# Patient Record
Sex: FEMALE | Race: OTHER | HISPANIC OR LATINO | URBAN - METROPOLITAN AREA
[De-identification: names, ages, dates, MRNs, and addresses within clinical notes are randomized per-mention and may not be internally consistent; named-entity substitution may affect disease eponyms.]

---

## 2024-07-12 VITALS
HEIGHT: 63 IN | OXYGEN SATURATION: 97 % | WEIGHT: 214.95 LBS | DIASTOLIC BLOOD PRESSURE: 84 MMHG | SYSTOLIC BLOOD PRESSURE: 147 MMHG | RESPIRATION RATE: 16 BRPM | TEMPERATURE: 98 F | HEART RATE: 70 BPM

## 2024-07-12 LAB
ADD ON TEST-SPECIMEN IN LAB: SIGNIFICANT CHANGE UP
ANION GAP SERPL CALC-SCNC: 14 MMOL/L — SIGNIFICANT CHANGE UP (ref 5–17)
ANISOCYTOSIS BLD QL: SLIGHT — SIGNIFICANT CHANGE UP
BASOPHILS # BLD AUTO: 0 K/UL — SIGNIFICANT CHANGE UP (ref 0–0.2)
BASOPHILS NFR BLD AUTO: 0 % — SIGNIFICANT CHANGE UP (ref 0–2)
BUN SERPL-MCNC: 11 MG/DL — SIGNIFICANT CHANGE UP (ref 7–23)
CALCIUM SERPL-MCNC: 9.3 MG/DL — SIGNIFICANT CHANGE UP (ref 8.4–10.5)
CHLORIDE SERPL-SCNC: 93 MMOL/L — LOW (ref 96–108)
CO2 SERPL-SCNC: 28 MMOL/L — SIGNIFICANT CHANGE UP (ref 22–31)
CREAT SERPL-MCNC: 0.53 MG/DL — SIGNIFICANT CHANGE UP (ref 0.5–1.3)
EGFR: 98 ML/MIN/1.73M2 — SIGNIFICANT CHANGE UP
EOSINOPHIL # BLD AUTO: 0 K/UL — SIGNIFICANT CHANGE UP (ref 0–0.5)
EOSINOPHIL NFR BLD AUTO: 0 % — SIGNIFICANT CHANGE UP (ref 0–6)
GIANT PLATELETS BLD QL SMEAR: PRESENT — SIGNIFICANT CHANGE UP
GLUCOSE SERPL-MCNC: 136 MG/DL — HIGH (ref 70–99)
HCT VFR BLD CALC: 48.3 % — HIGH (ref 34.5–45)
HGB BLD-MCNC: 15.7 G/DL — HIGH (ref 11.5–15.5)
HOWELL-JOLLY BOD BLD QL SMEAR: PRESENT — SIGNIFICANT CHANGE UP
HYPOCHROMIA BLD QL: SLIGHT — SIGNIFICANT CHANGE UP
LIDOCAIN IGE QN: 73 U/L — HIGH (ref 7–60)
LYMPHOCYTES # BLD AUTO: 0.43 K/UL — LOW (ref 1–3.3)
LYMPHOCYTES # BLD AUTO: 3.5 % — LOW (ref 13–44)
MACROCYTES BLD QL: SLIGHT — SIGNIFICANT CHANGE UP
MANUAL SMEAR VERIFICATION: SIGNIFICANT CHANGE UP
MCHC RBC-ENTMCNC: 31.3 PG — SIGNIFICANT CHANGE UP (ref 27–34)
MCHC RBC-ENTMCNC: 32.5 GM/DL — SIGNIFICANT CHANGE UP (ref 32–36)
MCV RBC AUTO: 96.2 FL — SIGNIFICANT CHANGE UP (ref 80–100)
MICROCYTES BLD QL: SLIGHT — SIGNIFICANT CHANGE UP
MONOCYTES # BLD AUTO: 0.22 K/UL — SIGNIFICANT CHANGE UP (ref 0–0.9)
MONOCYTES NFR BLD AUTO: 1.8 % — LOW (ref 2–14)
NEUTROPHILS # BLD AUTO: 11.75 K/UL — HIGH (ref 1.8–7.4)
NEUTROPHILS NFR BLD AUTO: 94.7 % — HIGH (ref 43–77)
PLAT MORPH BLD: ABNORMAL
PLATELET # BLD AUTO: 90 K/UL — LOW (ref 150–400)
POIKILOCYTOSIS BLD QL AUTO: SLIGHT — SIGNIFICANT CHANGE UP
POLYCHROMASIA BLD QL SMEAR: SLIGHT — SIGNIFICANT CHANGE UP
POTASSIUM SERPL-MCNC: SIGNIFICANT CHANGE UP (ref 3.5–5.3)
POTASSIUM SERPL-SCNC: SIGNIFICANT CHANGE UP (ref 3.5–5.3)
RBC # BLD: 5.02 M/UL — SIGNIFICANT CHANGE UP (ref 3.8–5.2)
RBC # FLD: 14.8 % — HIGH (ref 10.3–14.5)
RBC BLD AUTO: ABNORMAL
SCHISTOCYTES BLD QL AUTO: SLIGHT — SIGNIFICANT CHANGE UP
SODIUM SERPL-SCNC: 135 MMOL/L — SIGNIFICANT CHANGE UP (ref 135–145)
SPHEROCYTES BLD QL SMEAR: SLIGHT — SIGNIFICANT CHANGE UP
STOMATOCYTES BLD QL SMEAR: SLIGHT — SIGNIFICANT CHANGE UP
TARGETS BLD QL SMEAR: SLIGHT — SIGNIFICANT CHANGE UP
TROPONIN T, HIGH SENSITIVITY RESULT: 280 NG/L — CRITICAL HIGH (ref 0–51)
WBC # BLD: 12.41 K/UL — HIGH (ref 3.8–10.5)
WBC # FLD AUTO: 12.41 K/UL — HIGH (ref 3.8–10.5)

## 2024-07-12 PROCEDURE — 99285 EMERGENCY DEPT VISIT HI MDM: CPT

## 2024-07-12 PROCEDURE — 71045 X-RAY EXAM CHEST 1 VIEW: CPT | Mod: 26

## 2024-07-12 RX ORDER — ASPIRIN 325 MG/1
324 TABLET, FILM COATED ORAL ONCE
Refills: 0 | Status: COMPLETED | OUTPATIENT
Start: 2024-07-12 | End: 2024-07-12

## 2024-07-12 RX ADMIN — ASPIRIN 324 MILLIGRAM(S): 325 TABLET, FILM COATED ORAL at 23:48

## 2024-07-12 NOTE — ED ADULT NURSE NOTE - OBJECTIVE STATEMENT
Pt presented to ED c/o left sided chest tightness started @ 11 am. Pt states she was seen & discharged at [facility] today.  Pt states she was not happy with the care, as per patient, she was told she is fine & is not having a heart attack & was sent home.

## 2024-07-12 NOTE — ED PROVIDER NOTE - OBJECTIVE STATEMENT
74 year old F with ho HTN, HLD presenting with 1 day of midsternal chest pressure with associated PENA and sob. Exertional, sometimes pleuritic. No fever, chills. No cough, no nausea or vomiting, no lightheadedness or dizziness. Was at [facility] ER, discharged with cards fu. States symptoms are worsening. ROS as above.

## 2024-07-12 NOTE — ED ADULT NURSE NOTE - CHIEF COMPLAINT QUOTE
chest pain since this morning; was seen  and d/cd at [place] ER today but pain got worst tonight; h/o HTN and arthritis

## 2024-07-12 NOTE — ED ADULT TRIAGE NOTE - CHIEF COMPLAINT QUOTE
chest pain since this morning; was seen  and d/cd at [place] ER today but pain got worst tonight; h/o HTN and arthritis

## 2024-07-12 NOTE — ED PROVIDER NOTE - CLINICAL SUMMARY MEDICAL DECISION MAKING FREE TEXT BOX
73 yo F presenting with cp, concern for acs, does not perc out, will d-dimer for ro pe, cxr, reassess.

## 2024-07-12 NOTE — ED ADULT TRIAGE NOTE - WEIGHT IN LBS
RN cannot approve Refill Request    RN can NOT refill this medication Protocol failed and NO refill given.      Perlita Mckeon, [facility] Triage/Med Refill 3/16/2020    Requested Prescriptions   Pending Prescriptions Disp Refills     simvastatin (ZOCOR) 20 MG tablet [Pharmacy Med Name: Simvastatin Oral Tablet 20 MG] 45 tablet 0     Sig: TAKE 1/2 TABLET BY MOUTH EVERY DAY       Statins Refill Protocol (Hmg CoA Reductase Inhibitors) Failed - 3/14/2020  7:03 AM        Failed - PCP or prescribing provider visit in past 12 months      Last office visit with prescriber/PCP: 8/21/2018 Faizan Villagomez MD OR same dept: Visit date not found OR same specialty: 8/21/2018 Faizan Villagomez MD  Last physical: 12/19/2017 Last MTM visit: Visit date not found   Next visit within 3 mo: Visit date not found  Next physical within 3 mo: Visit date not found  Prescriber OR PCP: Faizan Villagomez MD  Last diagnosis associated with med order: 1. Hyperlipidemia  - simvastatin (ZOCOR) 20 MG tablet [Pharmacy Med Name: Simvastatin Oral Tablet 20 MG]; TAKE 1/2 TABLET BY MOUTH EVERY DAY  Dispense: 45 tablet; Refill: 0    If protocol passes may refill for 12 months if within 3 months of last provider visit (or a total of 15 months).                   
214.9

## 2024-07-12 NOTE — ED ADULT NURSE NOTE - NSFALLUNIVINTERV_ED_ALL_ED
Bed/Stretcher in lowest position, wheels locked, appropriate side rails in place/Call bell, personal items and telephone in reach/Instruct patient to call for assistance before getting out of bed/chair/stretcher/Non-slip footwear applied when patient is off stretcher/[place] to call system/Physically safe environment - no spills, clutter or unnecessary equipment/Purposeful proactive rounding/Room/bathroom lighting operational, light cord in reach

## 2024-07-12 NOTE — ED PROVIDER NOTE - PHYSICAL EXAMINATION
General: Well appearing, in no acute distress  HEENT: Normocephalic, atraumatic, extraocular movements intact  CV: Regular rate  Pulm: No respiratory distress, no tachypnea, CTAB, no wrr  Abd: Flat, no gross distension  Ext: warm and well perfused, no LE edema  Skin: No gross rashes or lesions  Neuro: Alert and oriented, moving all extremities

## 2024-07-13 ENCOUNTER — INPATIENT (INPATIENT)
Facility: HOSPITAL | Age: 74
LOS: 1 days | Discharge: HOME | End: 2024-07-15
Attending: INTERNAL MEDICINE | Admitting: STUDENT IN AN ORGANIZED HEALTH CARE EDUCATION/TRAINING PROGRAM
Payer: MEDICARE

## 2024-07-13 DIAGNOSIS — E78.5 HYPERLIPIDEMIA, UNSPECIFIED: ICD-10-CM

## 2024-07-13 DIAGNOSIS — Z98.890 OTHER SPECIFIED POSTPROCEDURAL STATES: Chronic | ICD-10-CM

## 2024-07-13 DIAGNOSIS — I21.4 NON-ST ELEVATION (NSTEMI) MYOCARDIAL INFARCTION: ICD-10-CM

## 2024-07-13 DIAGNOSIS — D72.829 ELEVATED WHITE BLOOD CELL COUNT, UNSPECIFIED: ICD-10-CM

## 2024-07-13 DIAGNOSIS — I10 ESSENTIAL (PRIMARY) HYPERTENSION: ICD-10-CM

## 2024-07-13 DIAGNOSIS — Z90.49 ACQUIRED ABSENCE OF OTHER SPECIFIED PARTS OF DIGESTIVE TRACT: Chronic | ICD-10-CM

## 2024-07-13 LAB
A1C WITH ESTIMATED AVERAGE GLUCOSE RESULT: 5.9 % — HIGH (ref 4–5.6)
ADD ON TEST-SPECIMEN IN LAB: SIGNIFICANT CHANGE UP
ANION GAP SERPL CALC-SCNC: 15 MMOL/L — SIGNIFICANT CHANGE UP (ref 5–17)
APTT BLD: 120.4 SEC — CRITICAL HIGH (ref 24.5–35.6)
BUN SERPL-MCNC: 11 MG/DL — SIGNIFICANT CHANGE UP (ref 7–23)
CALCIUM SERPL-MCNC: 9.1 MG/DL — SIGNIFICANT CHANGE UP (ref 8.4–10.5)
CHLORIDE SERPL-SCNC: 94 MMOL/L — LOW (ref 96–108)
CHOLEST SERPL-MCNC: 144 MG/DL — SIGNIFICANT CHANGE UP
CK MB CFR SERPL CALC: 69.7 NG/ML — HIGH (ref 0–6.7)
CK MB CFR SERPL CALC: 70.4 NG/ML — HIGH (ref 0–6.7)
CK SERPL-CCNC: 564 U/L — HIGH (ref 25–170)
CK SERPL-CCNC: 588 U/L — HIGH (ref 25–170)
CO2 SERPL-SCNC: 27 MMOL/L — SIGNIFICANT CHANGE UP (ref 22–31)
CREAT SERPL-MCNC: 0.55 MG/DL — SIGNIFICANT CHANGE UP (ref 0.5–1.3)
EGFR: 97 ML/MIN/1.73M2 — SIGNIFICANT CHANGE UP
ESTIMATED AVERAGE GLUCOSE: 123 MG/DL — HIGH (ref 68–114)
GLUCOSE SERPL-MCNC: 175 MG/DL — HIGH (ref 70–99)
HCT VFR BLD CALC: 47.7 % — HIGH (ref 34.5–45)
HCT VFR BLD CALC: 48.1 % — HIGH (ref 34.5–45)
HDLC SERPL-MCNC: 43 MG/DL — LOW
HGB BLD-MCNC: 15.8 G/DL — HIGH (ref 11.5–15.5)
HGB BLD-MCNC: 16 G/DL — HIGH (ref 11.5–15.5)
LIPID PNL WITH DIRECT LDL SERPL: 86 MG/DL — SIGNIFICANT CHANGE UP
MAGNESIUM SERPL-MCNC: 1.6 MG/DL — SIGNIFICANT CHANGE UP (ref 1.6–2.6)
MCHC RBC-ENTMCNC: 31.2 PG — SIGNIFICANT CHANGE UP (ref 27–34)
MCHC RBC-ENTMCNC: 31.4 PG — SIGNIFICANT CHANGE UP (ref 27–34)
MCHC RBC-ENTMCNC: 32.8 GM/DL — SIGNIFICANT CHANGE UP (ref 32–36)
MCHC RBC-ENTMCNC: 33.5 GM/DL — SIGNIFICANT CHANGE UP (ref 32–36)
MCV RBC AUTO: 93.5 FL — SIGNIFICANT CHANGE UP (ref 80–100)
MCV RBC AUTO: 94.9 FL — SIGNIFICANT CHANGE UP (ref 80–100)
NON HDL CHOLESTEROL: 101 MG/DL — SIGNIFICANT CHANGE UP
NRBC # BLD: 0 /100 WBCS — SIGNIFICANT CHANGE UP (ref 0–0)
NRBC # BLD: 0 /100 WBCS — SIGNIFICANT CHANGE UP (ref 0–0)
PLATELET # BLD AUTO: 100 K/UL — LOW (ref 150–400)
PLATELET # BLD AUTO: 108 K/UL — LOW (ref 150–400)
POTASSIUM SERPL-MCNC: 3.3 MMOL/L — LOW (ref 3.5–5.3)
POTASSIUM SERPL-MCNC: 3.7 MMOL/L — SIGNIFICANT CHANGE UP (ref 3.5–5.3)
POTASSIUM SERPL-SCNC: 3.3 MMOL/L — LOW (ref 3.5–5.3)
POTASSIUM SERPL-SCNC: 3.7 MMOL/L — SIGNIFICANT CHANGE UP (ref 3.5–5.3)
RAPID RVP RESULT: SIGNIFICANT CHANGE UP
RBC # BLD: 5.07 M/UL — SIGNIFICANT CHANGE UP (ref 3.8–5.2)
RBC # BLD: 5.1 M/UL — SIGNIFICANT CHANGE UP (ref 3.8–5.2)
RBC # FLD: 14.5 % — SIGNIFICANT CHANGE UP (ref 10.3–14.5)
RBC # FLD: 14.6 % — HIGH (ref 10.3–14.5)
SARS-COV-2 RNA SPEC QL NAA+PROBE: SIGNIFICANT CHANGE UP
SODIUM SERPL-SCNC: 136 MMOL/L — SIGNIFICANT CHANGE UP (ref 135–145)
TRIGL SERPL-MCNC: 75 MG/DL — SIGNIFICANT CHANGE UP
TROPONIN T, HIGH SENSITIVITY RESULT: 579 NG/L — CRITICAL HIGH (ref 0–51)
TROPONIN T, HIGH SENSITIVITY RESULT: 894 NG/L — CRITICAL HIGH (ref 0–51)
WBC # BLD: 11.33 K/UL — HIGH (ref 3.8–10.5)
WBC # BLD: 13.05 K/UL — HIGH (ref 3.8–10.5)
WBC # FLD AUTO: 11.33 K/UL — HIGH (ref 3.8–10.5)
WBC # FLD AUTO: 13.05 K/UL — HIGH (ref 3.8–10.5)

## 2024-07-13 PROCEDURE — 93010 ELECTROCARDIOGRAM REPORT: CPT

## 2024-07-13 PROCEDURE — 99223 1ST HOSP IP/OBS HIGH 75: CPT

## 2024-07-13 PROCEDURE — 99152 MOD SED SAME PHYS/QHP 5/>YRS: CPT

## 2024-07-13 PROCEDURE — 93458 L HRT ARTERY/VENTRICLE ANGIO: CPT | Mod: 26

## 2024-07-13 RX ORDER — ASPIRIN 325 MG/1
81 TABLET, FILM COATED ORAL DAILY
Refills: 0 | Status: DISCONTINUED | OUTPATIENT
Start: 2024-07-13 | End: 2024-07-13

## 2024-07-13 RX ORDER — OXYCODONE AND ACETAMINOPHEN 5; 325 MG/1; MG/1
1 TABLET ORAL
Refills: 0 | DISCHARGE

## 2024-07-13 RX ORDER — CLOPIDOGREL BISULFATE 75 MG/1
600 TABLET, FILM COATED ORAL ONCE
Refills: 0 | Status: COMPLETED | OUTPATIENT
Start: 2024-07-13 | End: 2024-07-13

## 2024-07-13 RX ORDER — POTASSIUM CHLORIDE 600 MG/1
40 TABLET, FILM COATED, EXTENDED RELEASE ORAL EVERY 4 HOURS
Refills: 0 | Status: COMPLETED | OUTPATIENT
Start: 2024-07-13 | End: 2024-07-13

## 2024-07-13 RX ORDER — METOPROLOL TARTRATE 50 MG
25 TABLET ORAL DAILY
Refills: 0 | Status: DISCONTINUED | OUTPATIENT
Start: 2024-07-13 | End: 2024-07-15

## 2024-07-13 RX ORDER — HEPARIN SODIUM 50 [USP'U]/ML
5000 INJECTION, SOLUTION INTRAVENOUS ONCE
Refills: 0 | Status: COMPLETED | OUTPATIENT
Start: 2024-07-13 | End: 2024-07-13

## 2024-07-13 RX ORDER — METOPROLOL TARTRATE 50 MG
1 TABLET ORAL
Refills: 0 | DISCHARGE

## 2024-07-13 RX ORDER — HYDROCHLOROTHIAZIDE 25 MG
1 TABLET ORAL
Refills: 0 | DISCHARGE

## 2024-07-13 RX ORDER — ACETAMINOPHEN 325 MG
650 TABLET ORAL ONCE
Refills: 0 | Status: COMPLETED | OUTPATIENT
Start: 2024-07-13 | End: 2024-07-13

## 2024-07-13 RX ORDER — SODIUM CHLORIDE 0.9 % (FLUSH) 0.9 %
250 SYRINGE (ML) INJECTION ONCE
Refills: 0 | Status: COMPLETED | OUTPATIENT
Start: 2024-07-13 | End: 2024-07-13

## 2024-07-13 RX ORDER — ATORVASTATIN CALCIUM 20 MG/1
80 TABLET, FILM COATED ORAL AT BEDTIME
Refills: 0 | Status: DISCONTINUED | OUTPATIENT
Start: 2024-07-13 | End: 2024-07-14

## 2024-07-13 RX ORDER — PANTOPRAZOLE SODIUM 40 MG/10ML
40 INJECTION, POWDER, FOR SOLUTION INTRAVENOUS
Refills: 0 | Status: DISCONTINUED | OUTPATIENT
Start: 2024-07-13 | End: 2024-07-15

## 2024-07-13 RX ORDER — HEPARIN SODIUM 50 [USP'U]/ML
6000 INJECTION, SOLUTION INTRAVENOUS EVERY 6 HOURS
Refills: 0 | Status: DISCONTINUED | OUTPATIENT
Start: 2024-07-13 | End: 2024-07-13

## 2024-07-13 RX ORDER — MAGNESIUM OXIDE 400 MG/1
800 TABLET ORAL ONCE
Refills: 0 | Status: COMPLETED | OUTPATIENT
Start: 2024-07-13 | End: 2024-07-13

## 2024-07-13 RX ORDER — AMLODIPINE BESYLATE 2.5 MG/1
1 TABLET ORAL
Refills: 0 | DISCHARGE

## 2024-07-13 RX ORDER — HEPARIN SODIUM 50 [USP'U]/ML
INJECTION, SOLUTION INTRAVENOUS
Qty: 25000 | Refills: 0 | Status: DISCONTINUED | OUTPATIENT
Start: 2024-07-13 | End: 2024-07-13

## 2024-07-13 RX ORDER — SODIUM CHLORIDE 0.9 % (FLUSH) 0.9 %
1000 SYRINGE (ML) INJECTION
Refills: 0 | Status: DISCONTINUED | OUTPATIENT
Start: 2024-07-13 | End: 2024-07-13

## 2024-07-13 RX ORDER — AMLODIPINE BESYLATE 2.5 MG/1
10 TABLET ORAL DAILY
Refills: 0 | Status: DISCONTINUED | OUTPATIENT
Start: 2024-07-13 | End: 2024-07-15

## 2024-07-13 RX ORDER — OMEPRAZOLE 10 MG/1
40 CAPSULE, DELAYED RELEASE ORAL
Refills: 0 | DISCHARGE

## 2024-07-13 RX ADMIN — HEPARIN SODIUM 1000 UNIT(S)/HR: 50 INJECTION, SOLUTION INTRAVENOUS at 02:46

## 2024-07-13 RX ADMIN — Medication 25 MILLIGRAM(S): at 06:49

## 2024-07-13 RX ADMIN — Medication 500 MILLILITER(S): at 10:29

## 2024-07-13 RX ADMIN — ASPIRIN 81 MILLIGRAM(S): 325 TABLET, FILM COATED ORAL at 13:21

## 2024-07-13 RX ADMIN — Medication 75 MILLILITER(S): at 10:28

## 2024-07-13 RX ADMIN — POTASSIUM CHLORIDE 40 MILLIEQUIVALENT(S): 600 TABLET, FILM COATED, EXTENDED RELEASE ORAL at 13:21

## 2024-07-13 RX ADMIN — AMLODIPINE BESYLATE 10 MILLIGRAM(S): 2.5 TABLET ORAL at 06:49

## 2024-07-13 RX ADMIN — PANTOPRAZOLE SODIUM 40 MILLIGRAM(S): 40 INJECTION, POWDER, FOR SOLUTION INTRAVENOUS at 06:49

## 2024-07-13 RX ADMIN — Medication 200 MILLILITER(S): at 13:47

## 2024-07-13 RX ADMIN — CLOPIDOGREL BISULFATE 600 MILLIGRAM(S): 75 TABLET, FILM COATED ORAL at 03:23

## 2024-07-13 RX ADMIN — Medication 650 MILLIGRAM(S): at 22:41

## 2024-07-13 RX ADMIN — Medication 5 MILLIGRAM(S): at 21:48

## 2024-07-13 RX ADMIN — MAGNESIUM OXIDE 800 MILLIGRAM(S): 400 TABLET ORAL at 14:02

## 2024-07-13 RX ADMIN — ATORVASTATIN CALCIUM 80 MILLIGRAM(S): 20 TABLET, FILM COATED ORAL at 21:48

## 2024-07-13 RX ADMIN — HEPARIN SODIUM 5000 UNIT(S): 50 INJECTION, SOLUTION INTRAVENOUS at 02:45

## 2024-07-13 NOTE — PATIENT PROFILE ADULT - FALL HARM RISK - HARM RISK INTERVENTIONS

## 2024-07-13 NOTE — H&P ADULT - NSHPOUTPATIENTPROVIDERS_GEN_ALL_CORE
Pt states she does not have an established PCP/Cardiologist Pt states she does not have an established PCP/Cardiologist, Emergency contact/Cici- [phone]

## 2024-07-13 NOTE — H&P ADULT - PROBLEM SELECTOR PLAN 2
stable, continue HOME MEDS: Toprol XL 25mg PO daily, Amlodipine 10mg PO daily and HCTZ 25mg PO daily.

## 2024-07-13 NOTE — H&P ADULT - PROBLEM SELECTOR PLAN 4
WBC 12.41 with left shift, afebrile and asymptomatic.  --CXR unremarkable.  --will F/U UA.      N: NPO for ischemic evaluation  E: Maintain K>4.0 and Mg >2.0  VTE PPx: on Heparin gtt  Code: Full

## 2024-07-13 NOTE — H&P ADULT - PROBLEM SELECTOR PLAN 1
chest pressure significantly improved after ASA dose.  --Initial EKG revealed NSR@65BPM with slight ST changes in Lead III. Repeat EKG NSR@72BPM without acute ST/T wave changes,  --Trop T uptrending from 280 to 579, CKMB 43.6 to 69.7.  --received ASA/Plavix load in ED, will start on Heparin gtt per ACS protocol.  --obtain TTE in AM.  --NPO for possible cardiac catheterization.

## 2024-07-13 NOTE — H&P ADULT - NS ATTEND AMEND GEN_ALL_CORE FT
73 yo lady PMHx of HTN who is admitted for NSTEMI    Assessment  1. NSTEMI, active chest pain  2. HTN    Plan  1. Urgent LHC today  2. ASA 81mg PO QD, Plavix 75mg PO QD, loading doses given, heparin gtt until LHC  3. TTE  4. High intensity statin  5. Continue home anti-hypertensives, will add Imdur 30mg PO QD post LHC    During non face-to-face time, I reviewed relevant portions of the patient’s medical record. During face-to-face time, I took a relevant history and examined the patient. I also explained differential diagnoses, relevant cardiac diagnoses, workup, and management plan, which required a high level of medical decision making. I answered all questions related to the patient's medical conditions.     Fred Oliveros M.D.  Attending Cardiologist  [facility]

## 2024-07-13 NOTE — CHART NOTE - NSCHARTNOTEFT_GEN_A_CORE
Cardiac Catheterization Lab activated given rising Troponins and Chest pain, concerning for NSTEMI.     Pt is s/p LHC 7/13/24 revealing OM1 (20-30%) with otherwise clean coronaries. LVEDP 18mmHg, in setting of high blood pressure. LVgram revealed normal wall motion, EF preserved (formal echo pending).    --Access: Left radial artery  --IC: Chris Alegre/Toan    Post cath, heparin gtt and DAPT discontinued. Pt pending formal TTE.     Case discussed with Dr. Oliveros attending.

## 2024-07-13 NOTE — H&P ADULT - ASSESSMENT
74 yr old F with PMHx of HTN, hyperlipidemia who presents to [facility] ED 7/12/24 c/o sudden onset midsternal chest pressure x 1 day, EKG nonischemic, Cardiac enzymes uptrending, now admitted to cardiac telemetry for management of NSTEMI.      ASA:III		Mallampati class: III		    Sedation Plan: Moderate    Patient Is Suitable Candidate For Sedation: Yes    Cardiac: Risks & benefits of procedure and alternative therapy have been explained to the patient &/or HCP including but not limited to: allergic reaction, bleeding, infection, arrhythmia, renal and vascular compromise, limb damage, MI, CVA, emergent CABG and death. Informed consent obtained and in chart.

## 2024-07-13 NOTE — H&P ADULT - HISTORY OF PRESENT ILLNESS
74 yr old F with PMHx of HTN, hyperlipidemia who presents to [facility] ED 7/12/24 c/o worsening chest pain x 1 day. Patient describes it as being midsternal chest pressure 7/10 in severity, associated with SOB. Patient denies any N/V, diaphoresis, dizziness, palpitations, recent travel or sick contacts. Patient was seen earlier today at [facility] ER for her symptoms and was discharged with recommendation for outpatient cardiology follow-up. This evening her chest pressure increased in severity prompting her to be re-evaluated.    In ED, BP: 147/84, HR: 70s, RR:16, Temp: 97.5F, O2 sat: 97% RA. EKG revealed NSR@65BPM with slight ST changes in Lead III. Repeat EKG NSR@72BPM without acute ST/T wave changes. CXR unremarkable.    Labs notable for: WBC 12.41 with slight left shift, Lipase 73, D-dimer negative, Trop T 280, CKMB 43.6.    Patient treated with ASA 324mg PO x 1 dose with improvement in symptoms.    Patient currently stable, admitted to cardiac telemetry for management of NSTEMI.    74 yr old F with PMHx of HTN, hyperlipidemia who presents to [facility] ED 7/12/24 c/o progressively worsening chest pain x 1 day. Patient describes it as being sudden onset midsternal chest pressure 7/10 in severity, associated with SOB. Patient denies any N/V, diaphoresis, dizziness, palpitations, recent travel or sick contacts. Patient was seen earlier today at [facility] ER for her symptoms and was discharged with recommendation for outpatient cardiology follow-up. This evening her chest pressure increased in severity prompting her to be re-evaluated.    In ED, BP: 147/84, HR: 70s, RR:16, Temp: 97.5F, O2 sat: 97% RA. EKG revealed NSR@65BPM with slight ST changes in Lead III. Repeat EKG NSR@72BPM without acute ST/T wave changes. CXR unremarkable. Labs notable for: WBC 12.41 with slight left shift, Lipase 73, D-dimer negative, Trop T 280, CKMB 43.6.    Patient treated with ASA 324mg PO x 1 dose with improvement in symptoms.    Patient currently stable, admitted to cardiac telemetry for management of NSTEMI.

## 2024-07-13 NOTE — H&P ADULT - PROBLEM SELECTOR PLAN 3
not on statin at home, will start Atorvastatin 80mg PO qhs in setting of NSTEMI.  --F/U LFTs/Lipid panel.

## 2024-07-14 DIAGNOSIS — R79.89 OTHER SPECIFIED ABNORMAL FINDINGS OF BLOOD CHEMISTRY: ICD-10-CM

## 2024-07-14 LAB
ADD ON TEST-SPECIMEN IN LAB: SIGNIFICANT CHANGE UP
ALBUMIN SERPL ELPH-MCNC: 3.6 G/DL — SIGNIFICANT CHANGE UP (ref 3.3–5)
ALP SERPL-CCNC: 113 U/L — SIGNIFICANT CHANGE UP (ref 40–120)
ALT FLD-CCNC: 46 U/L — HIGH (ref 10–45)
ANION GAP SERPL CALC-SCNC: 12 MMOL/L — SIGNIFICANT CHANGE UP (ref 5–17)
AST SERPL-CCNC: 101 U/L — HIGH (ref 10–40)
BASOPHILS # BLD AUTO: 0.04 K/UL — SIGNIFICANT CHANGE UP (ref 0–0.2)
BASOPHILS NFR BLD AUTO: 0.4 % — SIGNIFICANT CHANGE UP (ref 0–2)
BILIRUB SERPL-MCNC: 1.4 MG/DL — HIGH (ref 0.2–1.2)
BUN SERPL-MCNC: 7 MG/DL — SIGNIFICANT CHANGE UP (ref 7–23)
CALCIUM SERPL-MCNC: 9.1 MG/DL — SIGNIFICANT CHANGE UP (ref 8.4–10.5)
CHLORIDE SERPL-SCNC: 94 MMOL/L — LOW (ref 96–108)
CO2 SERPL-SCNC: 26 MMOL/L — SIGNIFICANT CHANGE UP (ref 22–31)
CREAT SERPL-MCNC: 0.47 MG/DL — LOW (ref 0.5–1.3)
CRP SERPL-MCNC: 3.9 MG/L — SIGNIFICANT CHANGE UP (ref 0–4)
CRP SERPL-MCNC: <3 MG/L — SIGNIFICANT CHANGE UP (ref 0–4)
EGFR: 101 ML/MIN/1.73M2 — SIGNIFICANT CHANGE UP
EOSINOPHIL # BLD AUTO: 0 K/UL — SIGNIFICANT CHANGE UP (ref 0–0.5)
EOSINOPHIL NFR BLD AUTO: 0 % — SIGNIFICANT CHANGE UP (ref 0–6)
ERYTHROCYTE [SEDIMENTATION RATE] IN BLOOD: 33 MM/HR — HIGH
GLUCOSE BLDC GLUCOMTR-MCNC: 119 MG/DL — HIGH (ref 70–99)
GLUCOSE SERPL-MCNC: 129 MG/DL — HIGH (ref 70–99)
HCT VFR BLD CALC: 46.3 % — HIGH (ref 34.5–45)
HGB BLD-MCNC: 16 G/DL — HIGH (ref 11.5–15.5)
IMM GRANULOCYTES NFR BLD AUTO: 0.4 % — SIGNIFICANT CHANGE UP (ref 0–0.9)
LYMPHOCYTES # BLD AUTO: 2.48 K/UL — SIGNIFICANT CHANGE UP (ref 1–3.3)
LYMPHOCYTES # BLD AUTO: 21.9 % — SIGNIFICANT CHANGE UP (ref 13–44)
MAGNESIUM SERPL-MCNC: 1.7 MG/DL — SIGNIFICANT CHANGE UP (ref 1.6–2.6)
MCHC RBC-ENTMCNC: 31 PG — SIGNIFICANT CHANGE UP (ref 27–34)
MCHC RBC-ENTMCNC: 34.6 GM/DL — SIGNIFICANT CHANGE UP (ref 32–36)
MCV RBC AUTO: 89.7 FL — SIGNIFICANT CHANGE UP (ref 80–100)
MONOCYTES # BLD AUTO: 0.81 K/UL — SIGNIFICANT CHANGE UP (ref 0–0.9)
MONOCYTES NFR BLD AUTO: 7.1 % — SIGNIFICANT CHANGE UP (ref 2–14)
NEUTROPHILS # BLD AUTO: 7.98 K/UL — HIGH (ref 1.8–7.4)
NEUTROPHILS NFR BLD AUTO: 70.2 % — SIGNIFICANT CHANGE UP (ref 43–77)
NRBC # BLD: 0 /100 WBCS — SIGNIFICANT CHANGE UP (ref 0–0)
PLATELET # BLD AUTO: 109 K/UL — LOW (ref 150–400)
POTASSIUM SERPL-MCNC: 3.9 MMOL/L — SIGNIFICANT CHANGE UP (ref 3.5–5.3)
POTASSIUM SERPL-SCNC: 3.9 MMOL/L — SIGNIFICANT CHANGE UP (ref 3.5–5.3)
PROT SERPL-MCNC: 8.7 G/DL — HIGH (ref 6–8.3)
RBC # BLD: 5.16 M/UL — SIGNIFICANT CHANGE UP (ref 3.8–5.2)
RBC # FLD: 14.9 % — HIGH (ref 10.3–14.5)
SODIUM SERPL-SCNC: 132 MMOL/L — LOW (ref 135–145)
WBC # BLD: 11.35 K/UL — HIGH (ref 3.8–10.5)
WBC # FLD AUTO: 11.35 K/UL — HIGH (ref 3.8–10.5)

## 2024-07-14 PROCEDURE — 93010 ELECTROCARDIOGRAM REPORT: CPT

## 2024-07-14 PROCEDURE — 99232 SBSQ HOSP IP/OBS MODERATE 35: CPT

## 2024-07-14 RX ORDER — MAGNESIUM SULFATE 100 %
1 POWDER (GRAM) MISCELLANEOUS ONCE
Refills: 0 | Status: COMPLETED | OUTPATIENT
Start: 2024-07-14 | End: 2024-07-14

## 2024-07-14 RX ORDER — HEPARIN SODIUM 50 [USP'U]/ML
5000 INJECTION, SOLUTION INTRAVENOUS EVERY 8 HOURS
Refills: 0 | Status: DISCONTINUED | OUTPATIENT
Start: 2024-07-14 | End: 2024-07-15

## 2024-07-14 RX ORDER — ATORVASTATIN CALCIUM 20 MG/1
20 TABLET, FILM COATED ORAL AT BEDTIME
Refills: 0 | Status: DISCONTINUED | OUTPATIENT
Start: 2024-07-14 | End: 2024-07-15

## 2024-07-14 RX ORDER — ACETAMINOPHEN 325 MG
650 TABLET ORAL ONCE
Refills: 0 | Status: COMPLETED | OUTPATIENT
Start: 2024-07-14 | End: 2024-07-14

## 2024-07-14 RX ORDER — MAGNESIUM OXIDE 400 MG/1
800 TABLET ORAL ONCE
Refills: 0 | Status: COMPLETED | OUTPATIENT
Start: 2024-07-14 | End: 2024-07-14

## 2024-07-14 RX ADMIN — PANTOPRAZOLE SODIUM 40 MILLIGRAM(S): 40 INJECTION, POWDER, FOR SOLUTION INTRAVENOUS at 05:46

## 2024-07-14 RX ADMIN — MAGNESIUM OXIDE 800 MILLIGRAM(S): 400 TABLET ORAL at 09:18

## 2024-07-14 RX ADMIN — Medication 650 MILLIGRAM(S): at 00:01

## 2024-07-14 RX ADMIN — Medication 25 MILLIGRAM(S): at 05:47

## 2024-07-14 RX ADMIN — Medication 650 MILLIGRAM(S): at 21:48

## 2024-07-14 RX ADMIN — Medication 100 GRAM(S): at 16:20

## 2024-07-14 RX ADMIN — ATORVASTATIN CALCIUM 20 MILLIGRAM(S): 20 TABLET, FILM COATED ORAL at 21:48

## 2024-07-14 RX ADMIN — Medication 650 MILLIGRAM(S): at 22:48

## 2024-07-14 RX ADMIN — AMLODIPINE BESYLATE 10 MILLIGRAM(S): 2.5 TABLET ORAL at 05:47

## 2024-07-14 RX ADMIN — HEPARIN SODIUM 5000 UNIT(S): 50 INJECTION, SOLUTION INTRAVENOUS at 21:48

## 2024-07-14 NOTE — PROGRESS NOTE ADULT - PROBLEM SELECTOR PLAN 4
- Lipid panel w/ low HDL 43. LDL 86.   - Not on home statin.  Had started Atorva 80 i/s/o suspected ACS, however now will deescalate to Atorva 20mg qhs.  - A1c 5.9%, prediabetic      ## Elevated liver enzymes  - 7/14 resulted as / ALT 46. AlkPhos WNL.  Monitor again in AM 7/15 and send Hepatitis panel.

## 2024-07-14 NOTE — PROGRESS NOTE ADULT - PROBLEM SELECTOR PLAN 1
P/w chest pain, which improved after ASA dose. No chest pain today.  --Initial EKG revealed NSR@65BPM with slight ST changes in Lead III. Repeat EKG NSR@72BPM without acute ST/T wave changes. EKG 7/13 AM with new negative deflection QRS in III.   --TropT uptrending from 280 to 579 to 894. CK/ CKMB also elevated. Pt ASA/Plavix loaded and Heparinized for r/o ACS.   -- Cath Lab activated 7/13/24 for suspected ACS/NSTEMI. s/p LHC 7/13/24 revealing OM1 (20-30%) with otherwise clean coronaries. LVEDP 18mmHg, in setting of high blood pressure. LVgram revealed normal wall motion, EF preserved (formal echo pending).  Access: Left radial artery. IC: Chris Alegre/Toan  --Now suspect Myocarditis. CRP WNL, ESR 33.   --TTE pending  --cMRI if TTE abnormal (ordered);  keep NPO 7/15 after breakfast or lunch pending cMRI dept hours

## 2024-07-14 NOTE — PROGRESS NOTE ADULT - SUBJECTIVE AND OBJECTIVE BOX
Cardiology PA Adult Progress Note    Subjective Assessment: Patient seen and examined at bedside.   	  MEDICATIONS:  amLODIPine   Tablet 10 milliGRAM(s) Oral daily  hydrochlorothiazide 25 milliGRAM(s) Oral daily  metoprolol succinate ER 25 milliGRAM(s) Oral daily    pantoprazole    Tablet 40 milliGRAM(s) Oral before breakfast    atorvastatin 80 milliGRAM(s) Oral at bedtime    chlorhexidine 4% Liquid 1 Application(s) Topical once      [PHYSICAL EXAM:  TELEMETRY:  T(C): 36.8 (07-14-24 @ 05:46), Max: 36.9 (07-13-24 @ 20:32)  HR: 79 (07-14-24 @ 05:46) (65 - 80)  BP: 155/91 (07-14-24 @ 05:46) (137/65 - 170/82)  RR: 17 (07-14-24 @ 05:46) (17 - 18)  SpO2: 96% (07-13-24 @ 21:22) (96% - 99%)  Wt(kg): --  I&O's Summary    13 Jul 2024 07:01  -  14 Jul 2024 07:00  --------------------------------------------------------  IN: 430 mL / OUT: 200 mL / NET: 230 mL            LABS:	 	  CARDIAC MARKERS:                        16.0   11.35 )-----------( 109      ( 14 Jul 2024 05:30 )             46.3     07-13    136  |  94<L>  |  11  ----------------------------<  175<H>  3.3<L>   |  27  |  0.55    Ca    9.1      13 Jul 2024 07:19  Mg     1.6     07-13      proBNP:   Lipid Profile:   HgA1c:   TSH:   PT/INR - ( 12 Jul 2024 22:49 )   PT: 13.8 sec;   INR: 1.22          PTT - ( 13 Jul 2024 08:17 )  PTT:120.4 sec     Cardiology PA Adult Progress Note    Subjective Assessment: Patient seen and examined at bedside. Pt denies chest pain and is hopeful to go home soon. Pt denies SOB, F/c, N/V/D.   	  MEDICATIONS:  amLODIPine   Tablet 10 milliGRAM(s) Oral daily  hydrochlorothiazide 25 milliGRAM(s) Oral daily  metoprolol succinate ER 25 milliGRAM(s) Oral daily    pantoprazole    Tablet 40 milliGRAM(s) Oral before breakfast    atorvastatin 80 milliGRAM(s) Oral at bedtime    chlorhexidine 4% Liquid 1 Application(s) Topical once      [PHYSICAL EXAM:  TELEMETRY: NSR  T(C): 36.8 (07-14-24 @ 05:46), Max: 36.9 (07-13-24 @ 20:32)  HR: 79 (07-14-24 @ 05:46) (65 - 80)  BP: 155/91 (07-14-24 @ 05:46) (137/65 - 170/82)  RR: 17 (07-14-24 @ 05:46) (17 - 18)  SpO2: 96% (07-13-24 @ 21:22) (96% - 99%)  Wt(kg): --  I&O's Summary    13 Jul 2024 07:01  -  14 Jul 2024 07:00  --------------------------------------------------------  IN: 430 mL / OUT: 200 mL / NET: 230 mL    Appearance: Pt seen in bed in NAD 	  HEENT:   Normal oral mucosa, PERRL, EOMI	  Neck: Supple  Cardiovascular: Normal S1 S2, No JVD, No murmurs  Respiratory: Lungs clear to auscultation b/l; No Rales, Rhonchi, Wheezing	  Gastrointestinal:  Soft, Non-tender, + BS	  Skin: No rashes, No ecchymoses, No cyanosis  Extremities: Normal range of motion, No clubbing, cyanosis or edema  Vascular: Peripheral pulses palpable 2+ bilaterally.  L radial access site no hematoma, no tenderness, distal pulse 2+  Neurologic: Non-focal  Psychiatry: A & O x 3, Mood & affect appropriate       LABS:	 	  CARDIAC MARKERS:                        16.0   11.35 )-----------( 109      ( 14 Jul 2024 05:30 )             46.3       07-14    132<L>  |  94<L>  |  7   ----------------------------<  129<H>  3.9   |  26  |  0.47<L>    Ca    9.1      14 Jul 2024 05:30  Mg     1.7     07-14    TPro  8.7<H>  /  Alb  3.6  /  TBili  1.4<H>  /  DBili  x   /  AST  101<H>  /  ALT  46<H>  /  AlkPhos  113  07-14      PT/INR - ( 12 Jul 2024 22:49 )   PT: 13.8 sec;   INR: 1.22          PTT - ( 13 Jul 2024 08:17 )  PTT:120.4 sec    CARDIAC MARKERS ( 13 Jul 2024 07:03 )  x     / x     / 588 U/L / x     / 70.4 ng/mL  CARDIAC MARKERS ( 13 Jul 2024 00:58 )  x     / x     / 564 U/L / x     / 69.7 ng/mL  CARDIAC MARKERS ( 12 Jul 2024 22:49 )  x     / x     / See Note / x     / 43.6 ng/mL

## 2024-07-14 NOTE — PROGRESS NOTE ADULT - PROBLEM SELECTOR PLAN 2
chest pressure significantly improved after ASA dose.  --Initial EKG revealed NSR@65BPM with slight ST changes in Lead III. Repeat EKG NSR@72BPM without acute ST/T wave changes. EKG 7/13 AM with new negative deflection QRS in III.   --TropT uptrending from 280 to 579 to 894. CK/ CKMB also elevated. Pt ASA/Plavix loaded and Heparinized for r/o ACS.   -- Cath Lab activated 7/13/24 for suspected ACS/NSTEMI. s/p LHC 7/13/24 revealing OM1 (20-30%) with otherwise clean coronaries. LVEDP 18mmHg, in setting of high blood pressure. LVgram revealed normal wall motion, EF preserved (formal echo pending).  Access: Left radial artery. IC: Chris Alegre/Toan  --Now suspect Myocarditis. CRP WNL, ESR 33.   --TTE pending  --cMRI if TTE abnormal (ordered);  keep NPO 7/15 after breakfast or lunch pending cMRI dept hours

## 2024-07-15 ENCOUNTER — TRANSCRIPTION ENCOUNTER (OUTPATIENT)
Age: 74
End: 2024-07-15

## 2024-07-15 ENCOUNTER — RESULT REVIEW (OUTPATIENT)
Age: 74
End: 2024-07-15

## 2024-07-15 VITALS
DIASTOLIC BLOOD PRESSURE: 59 MMHG | HEART RATE: 90 BPM | SYSTOLIC BLOOD PRESSURE: 147 MMHG | RESPIRATION RATE: 18 BRPM | OXYGEN SATURATION: 98 %

## 2024-07-15 LAB
ALBUMIN SERPL ELPH-MCNC: 3.4 G/DL — SIGNIFICANT CHANGE UP (ref 3.3–5)
ALP SERPL-CCNC: 120 U/L — SIGNIFICANT CHANGE UP (ref 40–120)
ALT FLD-CCNC: 39 U/L — SIGNIFICANT CHANGE UP (ref 10–45)
ANION GAP SERPL CALC-SCNC: 11 MMOL/L — SIGNIFICANT CHANGE UP (ref 5–17)
AST SERPL-CCNC: 81 U/L — HIGH (ref 10–40)
BILIRUB DIRECT SERPL-MCNC: 0.2 MG/DL — SIGNIFICANT CHANGE UP (ref 0–0.3)
BILIRUB INDIRECT FLD-MCNC: 1.1 MG/DL — HIGH (ref 0.2–1)
BILIRUB SERPL-MCNC: 1.3 MG/DL — HIGH (ref 0.2–1.2)
BUN SERPL-MCNC: 8 MG/DL — SIGNIFICANT CHANGE UP (ref 7–23)
CALCIUM SERPL-MCNC: 8.9 MG/DL — SIGNIFICANT CHANGE UP (ref 8.4–10.5)
CHLORIDE SERPL-SCNC: 94 MMOL/L — LOW (ref 96–108)
CO2 SERPL-SCNC: 29 MMOL/L — SIGNIFICANT CHANGE UP (ref 22–31)
CREAT SERPL-MCNC: 0.64 MG/DL — SIGNIFICANT CHANGE UP (ref 0.5–1.3)
EGFR: 94 ML/MIN/1.73M2 — SIGNIFICANT CHANGE UP
GLUCOSE SERPL-MCNC: 92 MG/DL — SIGNIFICANT CHANGE UP (ref 70–99)
HAV IGM SER-ACNC: SIGNIFICANT CHANGE UP
HBV CORE IGM SER-ACNC: SIGNIFICANT CHANGE UP
HBV SURFACE AG SER-ACNC: SIGNIFICANT CHANGE UP
HCT VFR BLD CALC: 50 % — HIGH (ref 34.5–45)
HCV AB S/CO SERPL IA: 0.07 S/CO — SIGNIFICANT CHANGE UP
HCV AB SERPL-IMP: SIGNIFICANT CHANGE UP
HGB BLD-MCNC: 16.4 G/DL — HIGH (ref 11.5–15.5)
MAGNESIUM SERPL-MCNC: 2.2 MG/DL — SIGNIFICANT CHANGE UP (ref 1.6–2.6)
MCHC RBC-ENTMCNC: 31 PG — SIGNIFICANT CHANGE UP (ref 27–34)
MCHC RBC-ENTMCNC: 32.8 GM/DL — SIGNIFICANT CHANGE UP (ref 32–36)
MCV RBC AUTO: 94.5 FL — SIGNIFICANT CHANGE UP (ref 80–100)
NRBC # BLD: 0 /100 WBCS — SIGNIFICANT CHANGE UP (ref 0–0)
PLATELET # BLD AUTO: 107 K/UL — LOW (ref 150–400)
POTASSIUM SERPL-MCNC: 3.7 MMOL/L — SIGNIFICANT CHANGE UP (ref 3.5–5.3)
POTASSIUM SERPL-SCNC: 3.7 MMOL/L — SIGNIFICANT CHANGE UP (ref 3.5–5.3)
PROT SERPL-MCNC: 8.4 G/DL — HIGH (ref 6–8.3)
RBC # BLD: 5.29 M/UL — HIGH (ref 3.8–5.2)
RBC # FLD: 14.7 % — HIGH (ref 10.3–14.5)
SODIUM SERPL-SCNC: 134 MMOL/L — LOW (ref 135–145)
WBC # BLD: 11.54 K/UL — HIGH (ref 3.8–10.5)
WBC # FLD AUTO: 11.54 K/UL — HIGH (ref 3.8–10.5)

## 2024-07-15 PROCEDURE — 82553 CREATINE MB FRACTION: CPT

## 2024-07-15 PROCEDURE — C1887: CPT

## 2024-07-15 PROCEDURE — 80048 BASIC METABOLIC PNL TOTAL CA: CPT

## 2024-07-15 PROCEDURE — 85025 COMPLETE CBC W/AUTO DIFF WBC: CPT

## 2024-07-15 PROCEDURE — 99239 HOSP IP/OBS DSCHRG MGMT >30: CPT

## 2024-07-15 PROCEDURE — C1769: CPT

## 2024-07-15 PROCEDURE — 85730 THROMBOPLASTIN TIME PARTIAL: CPT

## 2024-07-15 PROCEDURE — 84484 ASSAY OF TROPONIN QUANT: CPT

## 2024-07-15 PROCEDURE — 93306 TTE W/DOPPLER COMPLETE: CPT

## 2024-07-15 PROCEDURE — 83735 ASSAY OF MAGNESIUM: CPT

## 2024-07-15 PROCEDURE — 80053 COMPREHEN METABOLIC PANEL: CPT

## 2024-07-15 PROCEDURE — 0225U NFCT DS DNA&RNA 21 SARSCOV2: CPT

## 2024-07-15 PROCEDURE — 85652 RBC SED RATE AUTOMATED: CPT

## 2024-07-15 PROCEDURE — 83690 ASSAY OF LIPASE: CPT

## 2024-07-15 PROCEDURE — 86140 C-REACTIVE PROTEIN: CPT

## 2024-07-15 PROCEDURE — 93306 TTE W/DOPPLER COMPLETE: CPT | Mod: 26

## 2024-07-15 PROCEDURE — 80061 LIPID PANEL: CPT

## 2024-07-15 PROCEDURE — 80074 ACUTE HEPATITIS PANEL: CPT

## 2024-07-15 PROCEDURE — 93005 ELECTROCARDIOGRAM TRACING: CPT

## 2024-07-15 PROCEDURE — 36415 COLL VENOUS BLD VENIPUNCTURE: CPT

## 2024-07-15 PROCEDURE — 85610 PROTHROMBIN TIME: CPT

## 2024-07-15 PROCEDURE — 83036 HEMOGLOBIN GLYCOSYLATED A1C: CPT

## 2024-07-15 PROCEDURE — 80076 HEPATIC FUNCTION PANEL: CPT

## 2024-07-15 PROCEDURE — C1894: CPT

## 2024-07-15 PROCEDURE — 85379 FIBRIN DEGRADATION QUANT: CPT

## 2024-07-15 PROCEDURE — 85347 COAGULATION TIME ACTIVATED: CPT

## 2024-07-15 PROCEDURE — 84132 ASSAY OF SERUM POTASSIUM: CPT

## 2024-07-15 PROCEDURE — 85027 COMPLETE CBC AUTOMATED: CPT

## 2024-07-15 PROCEDURE — 99285 EMERGENCY DEPT VISIT HI MDM: CPT

## 2024-07-15 PROCEDURE — 82550 ASSAY OF CK (CPK): CPT

## 2024-07-15 PROCEDURE — 71045 X-RAY EXAM CHEST 1 VIEW: CPT

## 2024-07-15 PROCEDURE — 82962 GLUCOSE BLOOD TEST: CPT

## 2024-07-15 RX ORDER — ATORVASTATIN CALCIUM 20 MG/1
1 TABLET, FILM COATED ORAL
Qty: 30 | Refills: 3
Start: 2024-07-15 | End: 2024-11-11

## 2024-07-15 RX ORDER — POTASSIUM CHLORIDE 600 MG/1
40 TABLET, FILM COATED, EXTENDED RELEASE ORAL ONCE
Refills: 0 | Status: COMPLETED | OUTPATIENT
Start: 2024-07-15 | End: 2024-07-15

## 2024-07-15 RX ADMIN — HEPARIN SODIUM 5000 UNIT(S): 50 INJECTION, SOLUTION INTRAVENOUS at 05:15

## 2024-07-15 RX ADMIN — PANTOPRAZOLE SODIUM 40 MILLIGRAM(S): 40 INJECTION, POWDER, FOR SOLUTION INTRAVENOUS at 05:15

## 2024-07-15 RX ADMIN — POTASSIUM CHLORIDE 40 MILLIEQUIVALENT(S): 600 TABLET, FILM COATED, EXTENDED RELEASE ORAL at 14:34

## 2024-07-15 RX ADMIN — Medication 3 MILLIGRAM(S): at 00:57

## 2024-07-15 RX ADMIN — AMLODIPINE BESYLATE 10 MILLIGRAM(S): 2.5 TABLET ORAL at 05:14

## 2024-07-15 RX ADMIN — Medication 25 MILLIGRAM(S): at 05:15

## 2024-07-15 NOTE — DISCHARGE NOTE PROVIDER - ATTENDING DISCHARGE PHYSICAL EXAMINATION:
75 yo lady PMHx of HTN who presented with chest pain.    Assessment  1. Chest pain, elevated HsTrop -> LHC w/ normal coronary arteries  ESR/CRP negative, no pleuritic chest pain so no concern for pericarditis  2. HTN  3. HLD

## 2024-07-15 NOTE — PROGRESS NOTE ADULT - PROBLEM SELECTOR PLAN 5
WBC 11-12 with left shift, afebrile and asymptomatic.  --CXR unremarkable.  --RVP negative  --possibly inflammatory due to suspected myocarditis    N: DASH/TLC diet   E: Replete lytes PRN K<4, Mg<2   P: DVT PPX: on HSQ   C: FULL CODE   Dispo: case discussed with Dr. Oliveros
WBC 11-12 with left shift, afebrile and asymptomatic.  --CXR unremarkable.  --RVP negative  --possibly inflammatory due to suspected myocarditis    N: DASH/TLC diet   E: Replete lytes PRN K<4, Mg<2   P: DVT PPX: on HSQ   C: FULL CODE   Dispo: case discussed with Dr. Oliveros

## 2024-07-15 NOTE — PROGRESS NOTE ADULT - PROBLEM SELECTOR PLAN 4
- Lipid panel w/ low HDL 43. LDL 86.   - Not on home statin.  Had started Atorva 80 i/s/o suspected ACS, however now will deescalate to Atorva 20mg qhs.  - A1c 5.9%, prediabetic      ## Elevated liver enzymes  - 7/14 resulted as / ALT 46. AlkPhos WNL.  Monitor again in AM 7/15 and send Hepatitis panel. WBC 11-12 with left shift, afebrile and asymptomatic.  - CXR unremarkable.  - RVP negative  - Possibly inflammatory due to suspected myocarditis    N: DASH/TLC diet   E: Replete lytes PRN K<4, Mg<2   P: DVT PPX: on HSQ   C: FULL CODE   Dispo: case discussed with Dr. Oliveros

## 2024-07-15 NOTE — DIETITIAN INITIAL EVALUATION ADULT - PERSON TAUGHT/METHOD
- Via  #[id number]. Verbal Education provided to pt.   - Encouraged PO intake during meals & reviewed importance. Reviewed lean protein foods.  - Spoke about plate model.  - Understanding stated, provide additional motivation as needed.

## 2024-07-15 NOTE — DISCHARGE NOTE PROVIDER - HOSPITAL COURSE
74 yr old F with PMHx of HTN, hyperlipidemia who presents to [facility] ED 7/12/24 c/o progressively worsening chest pain x 1 day. Patient describes it as being sudden onset midsternal chest pressure 7/10 in severity, associated with SOB. Patient denies any N/V, diaphoresis, dizziness, palpitations, recent travel or sick contacts. Patient was seen earlier today at [facility] ER for her symptoms and was discharged with recommendation for outpatient cardiology follow-up. This evening her chest pressure increased in severity prompting her to be re-evaluated.    In ED, BP: 147/84, HR: 70s, RR:16, Temp: 97.5F, O2 sat: 97% RA. EKG revealed NSR@65BPM with slight ST changes in Lead III. Repeat EKG NSR@72BPM without acute ST/T wave changes. CXR unremarkable. Labs notable for: WBC 12.41 with slight left shift, Lipase 73, D-dimer negative, Trop T 280, CKMB 43.6.    Patient treated with ASA 324mg PO x 1 dose with improvement in symptoms. Patient currently stable, admitted to cardiac telemetry for management of NSTEMI.     Problem/Plan - 1:  ·  Problem: NSTEMI (non-ST elevation myocardial infarction).   ·  Plan: CP resolved  --Initial EKG revealed NSR@65BPM with slight ST changes in Lead III. Repeat EKG NSR@72BPM without acute ST/T wave changes,  -- S/p [facility] 7/14/24: OM1 (20-30%) with otherwise clean coronaries. LVEDP 18mmHg, in setting of high blood pressure. LVgram revealed normal wall motion.  Access: Left radial artery. IC: Chris Alegre  --Now suspect Myocarditis. CRP WNL, ESR 33.   -- TTE 7/15:   -- Pt was told to f/u with cMRI as outpatient      Problem/Plan - 2:  ·  Problem: Hypertension.   ·  Plan: stable, continue Home Meds: Toprol XL 25mg PO daily, Amlodipine 10mg PO daily and HCTZ 25mg PO daily.     Problem/Plan - 4:  ·  Problem: Hyperlipidemia.   ·  Plan: - Lipid panel w/ low HDL 43. LDL 86.   - Not on home statin.  Had started Atorva 80 i/s/o suspected ACS, however now will deescalate to Atorva 20mg qhs.  - A1c 5.9%, prediabetic      ## Elevated liver enzymes  - 7/14 resulted as AST 81/ ALT 39. AlkPhos WNL. Hep panel negative     Problem/Plan - 5:  ·  Problem: Leukocytosis.   ·  Plan: WBC 11-12 with left shift, afebrile and asymptomatic.  --CXR unremarkable.  --RVP negative  --possibly inflammatory due to suspected myocarditis      Pt seen and examined at bedside this AM without any complaints or events overnight, VSS, labs and telemetry reviewed and pt stable for discharge as discussed with Dr. De Oliveira. Pt has received appropriate discharge instructions, including medication regimen, access site management and follow up with  __ in 1-2 weeks.    Discharge medications: ASA 81mg QD, Plavix 75mg QD, ______________.         74 yr old F with PMHx of HTN, hyperlipidemia who presents to [facility] ED 7/12/24 c/o progressively worsening chest pain x 1 day. Patient describes it as being sudden onset midsternal chest pressure 7/10 in severity, associated with SOB. Patient denies any N/V, diaphoresis, dizziness, palpitations, recent travel or sick contacts. Patient was seen earlier today at [facility] ER for her symptoms and was discharged with recommendation for outpatient cardiology follow-up. This evening her chest pressure increased in severity prompting her to be re-evaluated.    In ED, BP: 147/84, HR: 70s, RR:16, Temp: 97.5F, O2 sat: 97% RA. EKG revealed NSR@65BPM with slight ST changes in Lead III. Repeat EKG NSR@72BPM without acute ST/T wave changes. CXR unremarkable. Labs notable for: WBC 12.41 with slight left shift, Lipase 73, D-dimer negative, Trop T 280, CKMB 43.6.    Patient treated with ASA 324mg PO x 1 dose with improvement in symptoms. Patient currently stable, admitted to cardiac telemetry for management of NSTEMI.     Problem/Plan - 1:  ·  Problem: myocardial infarction with nonobstructive coronary arteries.   ·  Plan: CP resolved  --Initial EKG revealed NSR@65BPM with slight ST changes in Lead III. Repeat EKG NSR@72BPM without acute ST/T wave changes,  -- S/p [facility] 7/14/24: OM1 (20-30%) with otherwise clean coronaries. LVEDP 18mmHg, in setting of high blood pressure. LVgram revealed normal wall motion.  Access: Left radial artery. IC: Chris Alegre  --Now suspect Myocarditis. CRP WNL, ESR 33.   -- TTE 7/15:  EF 57% Normal LV size, mild symmetric LVH, Pericardial fat pad is seen anterior to the right ventricle, cannot    -- Pt was told to f/u with cMRI as outpatient      Problem/Plan - 2:  ·  Problem: Hypertension.   ·  Plan: stable, continue Home Meds: Toprol XL 25mg PO daily, Amlodipine 10mg PO daily and HCTZ 25mg PO daily.     Problem/Plan - 4:  ·  Problem: Hyperlipidemia.   ·  Plan: - Lipid panel w/ low HDL 43. LDL 86.   - Not on home statin.  Had started Atorva 80 i/s/o suspected ACS, however now will deescalate to Atorva 20mg qhs.  - A1c 5.9%, prediabetic      ## Elevated liver enzymes  - 7/14 resulted as AST 81/ ALT 39. AlkPhos WNL. Hep panel negative     Problem/Plan - 5:  ·  Problem: Leukocytosis.   ·  Plan: WBC 11-12 with left shift, afebrile and asymptomatic.  --CXR unremarkable.  --RVP negative  --possibly inflammatory due to suspected myocarditis      Pt seen and examined at bedside this AM without any complaints or events overnight, VSS, labs and telemetry reviewed and pt stable for discharge as discussed with Dr. De Oliveira. Pt has received appropriate discharge instructions, including medication regimen, access site management and follow up with  __ in 1-2 weeks.

## 2024-07-15 NOTE — DIETITIAN INITIAL EVALUATION ADULT - PERTINENT MEDS FT
MEDICATIONS  (STANDING):  amLODIPine   Tablet 10 milliGRAM(s) Oral daily  atorvastatin 20 milliGRAM(s) Oral at bedtime  chlorhexidine 4% Liquid 1 Application(s) Topical once  heparin   Injectable 5000 Unit(s) SubCutaneous every 8 hours  hydrochlorothiazide 25 milliGRAM(s) Oral daily  metoprolol succinate ER 25 milliGRAM(s) Oral daily  pantoprazole    Tablet 40 milliGRAM(s) Oral before breakfast  potassium chloride    Tablet ER 40 milliEquivalent(s) Oral once    MEDICATIONS  (PRN):

## 2024-07-15 NOTE — PROGRESS NOTE ADULT - NS ATTEND AMEND GEN_ALL_CORE FT
73 yo lady PMHx of HTN who presented with chest pain.    Assessment  1. Chest pain, elevated HsTrop -> LHC w/ normal coronary arteries  ESR/CRP negative, no pleuritic chest pain so no concern for pericarditis  2. HTN  3. HLD    Plan  1. Obtain TTE + cardiacMRI  2. Continue home anti-hypertensives  3. Lipitor 20mg PO QHS    During non face-to-face time, I reviewed relevant portions of the patient’s medical record. During face-to-face time, I took a relevant history and examined the patient. I also explained differential diagnoses, relevant cardiac diagnoses, workup, and management plan, which required a moderate level of medical decision making. I answered all questions related to the patient's medical conditions.     Fred Oliveros M.D.  Attending Cardiologist  [facility].
73 yo lady PMHx of HTN who presented with chest pain.    Assessment  1. Chest pain, elevated HsTrop -> LHC w/ normal coronary arteries  ESR/CRP negative, no pleuritic chest pain so no concern for pericarditis  2. HTN  3. HLD    Plan  1. Obtain TTE , consider CMRI as an outpatient for evaluation of MINOCA/Myocarditis.  2. Continue home anti-hypertensives  3. Lipitor 20mg PO QHS    During non face-to-face time, I reviewed relevant portions of the patient’s medical record. During face-to-face time, I took a relevant history and examined the patient. I also explained differential diagnoses, relevant cardiac diagnoses, workup, and management plan, which required a moderate level of medical decision making. I answered all questions related to the patient's medical conditions.

## 2024-07-15 NOTE — DIETITIAN INITIAL EVALUATION ADULT - PERTINENT LABORATORY DATA
07-15    134<L>  |  94<L>  |  8   ----------------------------<  92  3.7   |  29  |  0.64    Ca    8.9      15 Jul 2024 07:30  Mg     2.2     07-15    TPro  8.4<H>  /  Alb  3.4  /  TBili  1.3<H>  /  DBili  0.2  /  AST  81<H>  /  ALT  39  /  AlkPhos  120  07-15  A1C with Estimated Average Glucose Result: 5.9 % (07-13-24 @ 07:22)

## 2024-07-15 NOTE — DIETITIAN INITIAL EVALUATION ADULT - OTHER CALCULATIONS
5'3''  pounds +-10%  Wt 220 pounds BMI 38.9 %[id number]  IBW used to calculate energy needs due to pt's current body weight exceeding 120% of IBW  Adjusted for age/BMI, Current medical conditions - fluids per team d/t prior low sodium

## 2024-07-15 NOTE — PROGRESS NOTE ADULT - PROBLEM SELECTOR PLAN 2
chest pressure significantly improved after ASA dose.  --Initial EKG revealed NSR@65BPM with slight ST changes in Lead III. Repeat EKG NSR@72BPM without acute ST/T wave changes. EKG 7/13 AM with new negative deflection QRS in III.   --TropT uptrending from 280 to 579 to 894. CK/ CKMB also elevated. Pt ASA/Plavix loaded and Heparinized for r/o ACS.   -- Cath Lab activated 7/13/24 for suspected ACS/NSTEMI. s/p LHC 7/13/24 revealing OM1 (20-30%) with otherwise clean coronaries. LVEDP 18mmHg, in setting of high blood pressure. LVgram revealed normal wall motion, EF preserved (formal echo pending).  Access: Left radial artery. IC: Chris Alegre/Toan  --Now suspect Myocarditis. CRP WNL, ESR 33.   --TTE pending  --cMRI if TTE abnormal (ordered);  keep NPO 7/15 after breakfast or lunch pending cMRI dept hours - C/w Home Meds: Toprol XL 25mg PO daily, Amlodipine 10mg PO daily and HCTZ 25mg PO daily.

## 2024-07-15 NOTE — PROGRESS NOTE ADULT - ASSESSMENT
74F with PMHx of HTN, HLD, who presented to [facility] ED 7/12/24 with sudden onset midsternal chest pressure x 1 day, EKG nonischemic, Cardiac enzymes uptrending, admitted to cardiac telemetry for troponemia. Cath Lab activated 7/13/24 s/p C with clean coronaries. Pt now pending TTE and possible cMRI to r/o myocarditis.         
74F with PMHx of HTN, HLD, who presented to [facility] ED 7/12/24 with sudden onset midsternal chest pressure x 1 day, EKG nonischemic, Cardiac enzymes uptrending, admitted to cardiac telemetry for troponemia. Cath Lab activated 7/13/24 s/p C with clean coronaries. Pt now pending TTE and possible cMRI to r/o myocarditis.

## 2024-07-15 NOTE — DISCHARGE NOTE PROVIDER - NSDCCPCAREPLAN_GEN_ALL_CORE_FT
PRINCIPAL DISCHARGE DIAGNOSIS  Diagnosis: Chest pain  Assessment and Plan of Treatment: You came to the hospital for evaluation of your chest pain, which has since then resolved. You had cardiac enzymes which were negative x 3, revealing no damage to the heart muscle, or a heart attack. You had an Echocardiogram (ultrasound of the heart) which was normal. You also had a CT of your heart which revealed no blockages in the arteries of your heart.  PLEASE CONTINUE TO FOLLOW UP WITH ASPIRIN 81MG DAILY.  Please follow up with  __ in 1-2 weeks. If you experience any worsening chest pain, palpitations, dizziness, or shortness of breath, please go to the nearest emergency room.     PRINCIPAL DISCHARGE DIAGNOSIS  Diagnosis: Chest pain  Assessment and Plan of Treatment: You came to the hospital for evaluation of your chest pain, which has since then resolved. You had cardiac enzymes which were positive x 3, revealing damage to the heart muscle, or a heart attack. You had an Echocardiogram (ultrasound of the heart) which was normal. You also had a CT of your heart which revealed no blockages in the arteries of your heart.  PLEASE CONTINUE TO FOLLOW UP WITH Atorvastatin 20mg DAILY.  Please follow up with Dr. Campbell in 1-2 weeks. If you experience any worsening chest pain, palpitations, dizziness, or shortness of breath, please go to the nearest emergency room.

## 2024-07-15 NOTE — DISCHARGE NOTE NURSING/CASE MANAGEMENT/SOCIAL WORK - NSDCPEFALRISK_GEN_ALL_CORE
For information on Fall & Injury Prevention, visit: [email or web address] OR  [email or web address] OR  https://www.cdc.gov/steadi/patient.html

## 2024-07-15 NOTE — DISCHARGE NOTE PROVIDER - CARE PROVIDER_API CALL
Syed Campbell  Cardiology  [address] [place] [address], [address]  [place] [address]  Phone: [phone]  Fax: [phone]  Follow Up Time:

## 2024-07-15 NOTE — DISCHARGE NOTE PROVIDER - NSDCMRMEDTOKEN_GEN_ALL_CORE_FT
amLODIPine 10 mg oral tablet: 1 tab(s) orally once a day  hydroCHLOROthiazide 25 mg oral tablet: 1 tab(s) orally once a day  Omeprazole: 40 milligram(s) orally once a day  oxycodone-acetaminophen 10 mg-325 mg oral tablet: 1 tab(s) orally every 6 hours as needed for  severe pain  Toprol-XL 25 mg oral tablet, extended release: 1 tab(s) orally once a day   amLODIPine 10 mg oral tablet: 1 tab(s) orally once a day  atorvastatin 20 mg oral tablet: 1 tab(s) orally once a day (at bedtime)  hydroCHLOROthiazide 25 mg oral tablet: 1 tab(s) orally once a day  Omeprazole: 40 milligram(s) orally once a day  oxycodone-acetaminophen 10 mg-325 mg oral tablet: 1 tab(s) orally every 6 hours as needed for  severe pain  Toprol-XL 25 mg oral tablet, extended release: 1 tab(s) orally once a day

## 2024-07-15 NOTE — PROGRESS NOTE ADULT - PROBLEM SELECTOR PLAN 3
stable, continue Home Meds: Toprol XL 25mg PO daily, Amlodipine 10mg PO daily and HCTZ 25mg PO daily. - Lipid panel w/ low HDL 43. LDL 86.   - Not on home statin.  Had started Atorva 80 i/s/o suspected ACS, however now will deescalate to Atorva 20mg qhs.  - A1c 5.9%, prediabetic      ## Elevated liver enzymes  - 7/14 resulted as / ALT 46. AlkPhos WNL.  Monitor again in AM 7/15 and send Hepatitis panel.

## 2024-07-15 NOTE — PROGRESS NOTE ADULT - PROBLEM SELECTOR PLAN 1
P/w chest pain, which improved after ASA dose. No chest pain today.  --Initial EKG revealed NSR@65BPM with slight ST changes in Lead III. Repeat EKG NSR@72BPM without acute ST/T wave changes. EKG 7/13 AM with new negative deflection QRS in III.   --TropT uptrending from 280 to 579 to 894. CK/ CKMB also elevated. Pt ASA/Plavix loaded and Heparinized for r/o ACS.   -- Cath Lab activated 7/13/24 for suspected ACS/NSTEMI. s/p LHC 7/13/24 revealing OM1 (20-30%) with otherwise clean coronaries. LVEDP 18mmHg, in setting of high blood pressure. LVgram revealed normal wall motion, EF preserved (formal echo pending).  Access: Left radial artery. IC: Chris Alegre/Toan  --Now suspect Myocarditis. CRP WNL, ESR 33.   --TTE pending  --cMRI if TTE abnormal (ordered);  keep NPO 7/15 after breakfast or lunch pending cMRI dept hours No chest pain today.  - Initial EKG revealed NSR@65BPM with slight ST changes in Lead III. Repeat EKG NSR@72BPM without acute ST/T wave changes. EKG 7/13 AM with new negative deflection QRS in III.   - TropT uptrending from 280 to 579 to 894. Pt ASA/Plavix loaded and Heparinized for r/o ACS.   - S/p LHC 7/13/24 revealing OM1 (20-30%) with otherwise clean coronaries. LVEDP 18mmHg, in setting of high blood pressure. LVgram normal wall motion, Access: Left radial artery. IC: Chris Alegre/Toan  - Now suspect Myocarditis. CRP WNL, ESR 33.   - TTE 7/15:  EF 57% Normal LV size, mild symmetric LVH, Pericardial fat pad is seen anterior to the right ventricle, cannot   rule out a small pericardial effusion.  - cMRI f/u as outpt

## 2024-07-15 NOTE — PROGRESS NOTE ADULT - SUBJECTIVE AND OBJECTIVE BOX
Interventional Cardiology PA Adult Progress Note    Subjective Assessment: Pt evaluated at beside this AM. Pt appears well and NAD. Denies CP, SOB, palpitation, n/v/d, fever, LOC, or headache.   	  MEDICATIONS:  amLODIPine   Tablet 10 milliGRAM(s) Oral daily  hydrochlorothiazide 25 milliGRAM(s) Oral daily  metoprolol succinate ER 25 milliGRAM(s) Oral daily  pantoprazole    Tablet 40 milliGRAM(s) Oral before breakfast  atorvastatin 20 milliGRAM(s) Oral at bedtime  chlorhexidine 4% Liquid 1 Application(s) Topical once  heparin   Injectable 5000 Unit(s) SubCutaneous every 8 hours  potassium chloride    Tablet ER 40 milliEquivalent(s) Oral once      	    [PHYSICAL EXAM:  TELEMETRY:  T(C): 37.1 (07-15-24 @ 05:15), Max: 37.1 (07-15-24 @ 05:15)  HR: 91 (07-15-24 @ 05:15) (72 - 91)  BP: 128/80 (07-15-24 @ 05:15) (123/83 - 149/70)  RR: 17 (07-15-24 @ 05:15) (16 - 18)  SpO2: 97% (07-15-24 @ 05:15) (94% - 98%)  Wt(kg): --  I&O's Summary    14 Jul 2024 07:01  -  15 Jul 2024 07:00  --------------------------------------------------------  IN: 360 mL / OUT: 0 mL / NET: 360 mL    15 Jul 2024 07:01  -  15 Jul 2024 09:17  --------------------------------------------------------  IN: 220 mL / OUT: 0 mL / NET: 220 mL        [place]:  Central/PICC/Mid Line:                                         Appearance: Normal	  HEENT:   Normal oral mucosa, PERRL, EOMI	  Neck: Supple, - JVD; Carotid Bruit   Cardiovascular: Normal S1 S2, No JVD, No murmurs,   Respiratory: Lungs clear to auscultation. No Rales, Rhonchi, Wheezing	  Gastrointestinal:  Soft, Non-tender, + BS	  Skin: No rashes, No ecchymoses, No cyanosis  Extremities: Normal range of motion, No clubbing, cyanosis or edema  Vascular: Peripheral pulses palpable 2+ bilaterally  Neurologic: Non-focal  Psychiatry: A & O x 3, Mood & affect appropriate                          16.4   11.54 )-----------( 107      ( 15 Jul 2024 07:30 )             50.0     07-15    x   |  x   |  8   ----------------------------<  92  3.7   |  29  |  0.64    Ca    8.9      15 Jul 2024 07:30  Mg     2.2     07-15    TPro  8.7<H>  /  Alb  3.6  /  TBili  1.4<H>  /  DBili  x   /  AST  101<H>  /  ALT  46<H>  /  AlkPhos  113  07-14

## 2024-07-15 NOTE — DIETITIAN INITIAL EVALUATION ADULT - OTHER INFO
74F with PMHx HTN, HLD, who presented to [facility] ED 7/12/24 with sudden onset midsternal chest pressure x1 day, EKG nonischemic, Cardiac enzymes uptrending, admitted to cardiac telemetry for troponemia. Cath Lab activated 7/13/24 s/p [facility] with clean coronaries. Pt now pending TTE and possible cMRI to r/o myocarditis.     Pt seen sitting up right on 5UR.  #[id number]. Seen with [employer] and [employer] bag at bedside. Consulted to be seen d/t MST score; pt reports decreased PO intake x 3 days d/t feeling unwell. Otherwise reports good PO intake. Seems to be on a regular diet PTA. Pt reports wanting to lose wt once d/c. Reports CBW of 230 pounds as of 1 weeks ago. Admit wt is 220 pounds. This would suggest 10 pound wt loss x 1 week PTA (4% Body wt loss) - unable to obtain bedscale wt d/t pt postioning. No overt s/s of wasting/loss note. Unable to DX malnutrition today - However should decreased PO remain ongoing, can assume pt at Risk. No issues chewing/swallowing. Reports possible Fish allergy vs food poisoning when consuming octopus x "years ago." Add Fish allergy to EMR 7/15. HDL 43,  - A1c 5.09%, Na 132, K WNL-low 7/12. Pain per flow sheets. No Edema or pressure ulcer. BM+ 7/13.   Please see below for nutritions recommendations.

## 2024-07-17 LAB — ISTAT ACTK (ACTIVATED CLOTTING TIME KAOLIN): 146 SEC — HIGH (ref 74–137)

## 2024-07-19 PROBLEM — G89.29 OTHER CHRONIC PAIN: Chronic | Status: ACTIVE | Noted: 2024-07-13

## 2024-07-19 PROBLEM — E78.5 HYPERLIPIDEMIA, UNSPECIFIED: Chronic | Status: ACTIVE | Noted: 2024-07-13

## 2024-07-19 PROBLEM — I10 ESSENTIAL (PRIMARY) HYPERTENSION: Chronic | Status: ACTIVE | Noted: 2024-07-13

## 2024-07-24 ENCOUNTER — EMERGENCY (EMERGENCY)
Facility: HOSPITAL | Age: 74
LOS: 1 days | Discharge: HOME | End: 2024-07-24
Admitting: EMERGENCY MEDICINE
Payer: MEDICARE

## 2024-07-24 VITALS
HEART RATE: 79 BPM | WEIGHT: 199.96 LBS | HEIGHT: 63 IN | DIASTOLIC BLOOD PRESSURE: 80 MMHG | OXYGEN SATURATION: 95 % | SYSTOLIC BLOOD PRESSURE: 131 MMHG | TEMPERATURE: 99 F | RESPIRATION RATE: 18 BRPM

## 2024-07-24 DIAGNOSIS — R21 RASH AND OTHER NONSPECIFIC SKIN ERUPTION: ICD-10-CM

## 2024-07-24 DIAGNOSIS — E78.5 HYPERLIPIDEMIA, UNSPECIFIED: ICD-10-CM

## 2024-07-24 DIAGNOSIS — Z90.49 ACQUIRED ABSENCE OF OTHER SPECIFIED PARTS OF DIGESTIVE TRACT: Chronic | ICD-10-CM

## 2024-07-24 DIAGNOSIS — B35.4 TINEA CORPORIS: ICD-10-CM

## 2024-07-24 DIAGNOSIS — Z98.890 OTHER SPECIFIED POSTPROCEDURAL STATES: Chronic | ICD-10-CM

## 2024-07-24 DIAGNOSIS — I10 ESSENTIAL (PRIMARY) HYPERTENSION: ICD-10-CM

## 2024-07-24 PROCEDURE — 99283 EMERGENCY DEPT VISIT LOW MDM: CPT

## 2024-07-24 PROCEDURE — 99284 EMERGENCY DEPT VISIT MOD MDM: CPT

## 2024-07-24 RX ORDER — KETOCONAZOLE 20 MG/G
1 CREAM TOPICAL
Qty: 1 | Refills: 0
Start: 2024-07-24 | End: 2024-08-13

## 2024-07-29 PROBLEM — Z00.00 ENCOUNTER FOR PREVENTIVE HEALTH EXAMINATION: Status: ACTIVE | Noted: 2024-07-29

## 2024-07-30 ENCOUNTER — APPOINTMENT (OUTPATIENT)
Dept: HEART AND VASCULAR | Facility: CLINIC | Age: 74
End: 2024-07-30

## 2024-08-06 NOTE — DISCUSSION/SUMMARY
[EKG obtained to assist in diagnosis and management of assessed problem(s)] : EKG obtained to assist in diagnosis and management of assessed problem(s) [FreeTextEntry1] : EKG 7/30/24  Plan 1. Cardiac MRI 2. Rechec hsTrop and CMP 3. Amlodipine, metop succinate, and HCTZ @ current dose 4. Lipitor at current dose

## 2024-08-06 NOTE — HISTORY OF PRESENT ILLNESS
[FreeTextEntry1] : 74 yr old F with PMHx of HTN, hyperlipidemia who presents to [facility] ED 7/12/24 c/o progressively worsening chest pain x 1 day. Patient describes it as being sudden onset midsternal chest pressure 7/10 in severity, associated with SOB. Patient denies any N/V, diaphoresis, dizziness, palpitations, recent travel or sick contacts. Patient was seen earlier today at [facility] ER for her symptoms and was discharged with recommendation for outpatient cardiology follow-up. This evening her chest pressure increased in severity prompting her to be re-evaluated.  In ED, BP: 147/84, HR: 70s, RR:16, Temp: 97.5F, O2 sat: 97% RA. EKG revealed NSR@65BPM with slight ST changes in Lead III. Repeat EKG NSR@72BPM without acute ST/T wave changes. CXR unremarkable. Labs notable for: WBC 12.41 with slight left shift, Lipase 73, D-dimer negative, Trop T 280, CKMB 43.6.  Patient treated with ASA 324mg PO x 1 dose with improvement in symptoms. Patient currently stable, admitted to cardiac telemetry for management of NSTEMI.   Problem/Plan - 1:   Problem: myocardial infarction with nonobstructive coronary arteries.    Plan: CP resolved --Initial EKG revealed NSR@65BPM with slight ST changes in Lead III. Repeat EKG NSR@72BPM without acute ST/T wave changes, -- S/p [facility] 7/14/24: OM1 (20-30%) with otherwise clean coronaries. LVEDP 18mmHg, in setting of high blood pressure. LVgram revealed normal wall motion.  Access: Left radial artery. IC: Chris Alegre --Now suspect Myocarditis. CRP WNL, ESR 33.  -- TTE 7/15:  EF 57% Normal LV size, mild symmetric LVH, Pericardial fat pad is seen anterior to the right ventricle, cannot   -- Pt was told to f/u with cMRI as outpatient    Problem/Plan - 2:   Problem: Hypertension.    Plan: stable, continue Home Meds: Toprol XL 25mg PO daily, Amlodipine 10mg PO daily and HCTZ 25mg PO daily.   Problem/Plan - 4:   Problem: Hyperlipidemia.    Plan: - Lipid panel w/ low HDL 43. LDL 86.  - Not on home statin.  Had started Atorva 80 i/s/o suspected ACS, however now will deescalate to Atorva 20mg qhs. - A1c 5.9%, prediabetic    ## Elevated liver enzymes - 7/14 resulted as AST 81/ ALT 39. AlkPhos WNL. Hep panel negative   Problem/Plan - 5:   Problem: Leukocytosis.    Plan: WBC 11-12 with left shift, afebrile and asymptomatic. --CXR unremarkable. --RVP negative --possibly inflammatory due to suspected myocarditis
